# Patient Record
(demographics unavailable — no encounter records)

---

## 2025-02-18 NOTE — ASSESSMENT
[FreeTextEntry1] : SPEECH - LANGUAGE EVALUATION  Date of Evaluation: 02/13/2025  Patient Name: Saad Mccullough  YOB: 1946  Primary Diagnosis: Dysarthria   Treatment Diagnosis: Dysarthria  Referring Physician: Dr. Best Lopez   REASON FOR REFERRAL:    Saad Mccullough is a 78-year-old male who was referred for a formal speech/language evaluation by their physician, Dr. Lopez, due to concerns for dysarthria, relating to history of CVA.     PRESENTING PROBLEM. Mr. Mccullough arrived to the evaluation and was received alert, awake, and in no apparent distress. As per patient report, the patient's PMHx is significant for CVA and Bell's Palsy. The patient reports difficulty with speech, particularly with /s/ and /z/ sounds as well as difficulty with short term memory. The patient reports that he first noted difficulty with speech in June of 2024, which prompted him to see Dr. Lopez (neurology). MRI confirmed multiple CVAs sometime last year.    MEDICAL HISTORY, per EMR:  Injury of left shoulder, initial encounter (S49.92XA)  Injury of left shoulder, subsequent encounter (S49.92XD)   Medications were reviewed and can be located in the patient's medical chart.  Allergies were reviewed and can be located in the patient's medical chart.   CLINICAL FINDINGS  ORAL PERIPHERAL EXAM:  Oral-Facial examination revealed facial symmetry to be grossly WFLs. Intra-oral assessment revealed lingual and labial strength and ROM with reduced labial retraction and reduced oral grading on the right side. Lingual movements WFLs. The oral cavity was adequately hydrated and upper airway breath sounds were clear at baseline.    SPEECH: The patient's speech was informally assessed through participating in a conversation with clinician as well as picture description. Patient was noted with dysarthria characterized by overall reduced intelligibility (about 50% intelligible at the conversational level, though increase in intelligibility with reading), reduced articulatory precision, reduced loudness, difficulty varying stress/intonation, and rapid rate. DDK rates were grossly WFLs.    LANGUAGE: Linguistic functioning was assessed informally and formally with subtests of the Burn's Left Hemisphere Inventory and the Right Hemisphere Inventory.  The patient presented with mild expressive language and a mild receptive language deficit, however it is speculated receptive language difficulties observed are due to memory deficits. Receptive language difficulties occurred only with two and three step command, sentences, and paragraphs. Only one error was observed with 3-step commands wherein patient accurately completed 2/3 steps, all other directions were completed with 100% accuracy. Auditory comprehension of sentences was limited as there were only 3 sentences asked. Patient answered 2/3 with 100% accuracy and one sentence with partial accuracy (when asked where we are going on vacation and what will we bring patient answered with 2/3 locations and 2/2 items that will be brought). Patient had the most difficulty with auditory retention of paragraphs and stated that he could not remember to multiple questions. Therefore, it is speculated difficulty with paragraph auditory retention tasks were attribute to immediate memory difficulties. 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 Verbal fluency was reduced (per RIPA-2, patient should name at least 15 items in a one-minute period). Informal assessment at the conversational level also revealed some word-finding difficulties.    Auditory Comprehension:   Yes/No Questions: 100%   Comprehension of words and sentences: 100%   Body Part Identification: 100%   Left/Right Discrimination: 100%  Two and Three Step Commands: 88%   Auditory Retention of Sentences: 67%  Auditory Retention of Paragraphs: 60%   Verbal Expression:   Automatic Speech: 100%  Verbal Repetition: 100%  Responsive Naming (nouns): 100%  Responsive Naming (verbs): 100%  Sentence Completion:  100%  Verbal Fluency: 12 animals named in one minute, 10 fruits named in one minute   Cognitive Assessment:   Organization: 100%  Immediate Memory (from RIPA-2, subtest 1): 80%   Recent Memory (from RIPA-2, subtest 2): 100%   Spaced Retrieval for Immediate Memory: 67%    VOICE: Perceptually, the patient's voice was mildly strained and rough. Measures of maximum phonation time and s/z ratio revealed MPT at 26 seconds and s/z ratio to be 7/15 (46%). Functional s/z ratio should be 1:1 (100%).     HEARING: Hearing abilities were adequate for the purposes of today's evaluation   IMPRESSION:   Mr. Mccullough is a 78-year-old male with a history of CVA and Bell's Palsy referred by Dr. Lopez due to concerns of dysarthria. Mr. Mccullough presents with dysarthria characterized by reduced speech intelligibility, articulatory imprecision, rapid rate, reduced loudness, and difficulty varying stress/intonation. Mr. Mccullough further presents with some immediate memory difficulties as well as anomia and receptive language difficulties, likely exacerbated by memory deficits.    PROGNOSIS: Good with therapeutic intervention and patient participation   RECOMMENDATIONS:  1.Recommend speech therapy ([id number]) 1x/week for 30 minutes for 8-10 weeks to target dysarthria, anomia, and memory.  2. Follow up with referring physician, as directed    EDUCATION: Education was provided regarding diagnoses and what a course of speech therapy will entail.    PLAN OF CARE:  Long Term Goals  1) The patient will produce 100% intelligible speech at the conversational level.   2) The patient will name multiple items belonging to a category.  3) The patient will independently recall details of a passage read aloud.    Short Term Goals  The patient will produce multiple sentences placing pauses in appropriate places in 80% of opportunities given frequent maximal verbal cues.  The patient will participate in conversation at 80% intelligibility given frequent maximal verbal cues to utilize clear speech strategies in order to communicate thoughts, feelings, and needs.  The patient will independently name 5 items belonging to a complex category independent of clinician cues.   The patient will recall 5 or more items (i.e. grocery list, medication list, etc.) after a 30-minute delay given intermittent minimal verbal cues in order to increase independence during functional memory tasks.  The patient will read paragraph-level information and answer questions about the material at 80% accuracy after a 5-minute delay.   Please contact this Center at [phone] if additional information is needed.   Shauna Montero MS, CF-SLP